# Patient Record
Sex: MALE | Race: WHITE | NOT HISPANIC OR LATINO | Employment: FULL TIME | URBAN - NONMETROPOLITAN AREA
[De-identification: names, ages, dates, MRNs, and addresses within clinical notes are randomized per-mention and may not be internally consistent; named-entity substitution may affect disease eponyms.]

---

## 2023-08-10 PROBLEM — N52.9 MALE ERECTILE DISORDER: Status: ACTIVE | Noted: 2023-08-10

## 2023-08-10 PROBLEM — H91.90 HEARING LOSS: Status: ACTIVE | Noted: 2023-08-10

## 2023-08-10 PROBLEM — I73.00 RAYNAUD'S DISEASE: Status: ACTIVE | Noted: 2023-08-10

## 2023-08-10 RX ORDER — KETOCONAZOLE 20 MG/G
CREAM TOPICAL
COMMUNITY
Start: 2017-01-10 | End: 2023-08-14 | Stop reason: ALTCHOICE

## 2023-08-10 RX ORDER — SILDENAFIL CITRATE 20 MG/1
TABLET ORAL
COMMUNITY
Start: 2021-08-03 | End: 2023-08-14 | Stop reason: ALTCHOICE

## 2023-08-14 ENCOUNTER — OFFICE VISIT (OUTPATIENT)
Dept: PRIMARY CARE | Facility: CLINIC | Age: 58
End: 2023-08-14
Payer: COMMERCIAL

## 2023-08-14 ENCOUNTER — LAB (OUTPATIENT)
Dept: LAB | Facility: LAB | Age: 58
End: 2023-08-14
Payer: COMMERCIAL

## 2023-08-14 VITALS
BODY MASS INDEX: 27.02 KG/M2 | SYSTOLIC BLOOD PRESSURE: 101 MMHG | HEIGHT: 68 IN | DIASTOLIC BLOOD PRESSURE: 62 MMHG | OXYGEN SATURATION: 98 % | HEART RATE: 50 BPM | TEMPERATURE: 98 F | WEIGHT: 178.3 LBS

## 2023-08-14 DIAGNOSIS — Z00.00 WELLNESS EXAMINATION: ICD-10-CM

## 2023-08-14 DIAGNOSIS — Z12.5 PROSTATE CANCER SCREENING: ICD-10-CM

## 2023-08-14 DIAGNOSIS — Z23 NEED FOR SHINGLES VACCINE: ICD-10-CM

## 2023-08-14 DIAGNOSIS — N52.9 MALE ERECTILE DISORDER: Primary | ICD-10-CM

## 2023-08-14 LAB
ALANINE AMINOTRANSFERASE (SGPT) (U/L) IN SER/PLAS: 16 U/L (ref 10–52)
ALBUMIN (G/DL) IN SER/PLAS: 4.7 G/DL (ref 3.4–5)
ALKALINE PHOSPHATASE (U/L) IN SER/PLAS: 63 U/L (ref 33–120)
ANION GAP IN SER/PLAS: 12 MMOL/L (ref 10–20)
ASPARTATE AMINOTRANSFERASE (SGOT) (U/L) IN SER/PLAS: 21 U/L (ref 9–39)
BILIRUBIN TOTAL (MG/DL) IN SER/PLAS: 0.9 MG/DL (ref 0–1.2)
CALCIUM (MG/DL) IN SER/PLAS: 9.2 MG/DL (ref 8.6–10.6)
CARBON DIOXIDE, TOTAL (MMOL/L) IN SER/PLAS: 27 MMOL/L (ref 21–32)
CHLORIDE (MMOL/L) IN SER/PLAS: 107 MMOL/L (ref 98–107)
CHOLESTEROL (MG/DL) IN SER/PLAS: 189 MG/DL (ref 0–199)
CHOLESTEROL IN HDL (MG/DL) IN SER/PLAS: 57.6 MG/DL
CHOLESTEROL/HDL RATIO: 3.3
CREATININE (MG/DL) IN SER/PLAS: 1.11 MG/DL (ref 0.5–1.3)
ERYTHROCYTE DISTRIBUTION WIDTH (RATIO) BY AUTOMATED COUNT: 12.1 % (ref 11.5–14.5)
ERYTHROCYTE MEAN CORPUSCULAR HEMOGLOBIN CONCENTRATION (G/DL) BY AUTOMATED: 32.4 G/DL (ref 32–36)
ERYTHROCYTE MEAN CORPUSCULAR VOLUME (FL) BY AUTOMATED COUNT: 88 FL (ref 80–100)
ERYTHROCYTES (10*6/UL) IN BLOOD BY AUTOMATED COUNT: 5.16 X10E12/L (ref 4.5–5.9)
GFR MALE: 77 ML/MIN/1.73M2
GLUCOSE (MG/DL) IN SER/PLAS: 91 MG/DL (ref 74–99)
HEMATOCRIT (%) IN BLOOD BY AUTOMATED COUNT: 45.4 % (ref 41–52)
HEMOGLOBIN (G/DL) IN BLOOD: 14.7 G/DL (ref 13.5–17.5)
LDL: 109 MG/DL (ref 0–99)
LEUKOCYTES (10*3/UL) IN BLOOD BY AUTOMATED COUNT: 5.1 X10E9/L (ref 4.4–11.3)
NRBC (PER 100 WBCS) BY AUTOMATED COUNT: 0 /100 WBC (ref 0–0)
PLATELETS (10*3/UL) IN BLOOD AUTOMATED COUNT: 178 X10E9/L (ref 150–450)
POTASSIUM (MMOL/L) IN SER/PLAS: 4 MMOL/L (ref 3.5–5.3)
PROTEIN TOTAL: 7.1 G/DL (ref 6.4–8.2)
SODIUM (MMOL/L) IN SER/PLAS: 142 MMOL/L (ref 136–145)
TRIGLYCERIDE (MG/DL) IN SER/PLAS: 112 MG/DL (ref 0–149)
UREA NITROGEN (MG/DL) IN SER/PLAS: 14 MG/DL (ref 6–23)
VLDL: 22 MG/DL (ref 0–40)

## 2023-08-14 PROCEDURE — 1036F TOBACCO NON-USER: CPT | Performed by: FAMILY MEDICINE

## 2023-08-14 PROCEDURE — 80053 COMPREHEN METABOLIC PANEL: CPT

## 2023-08-14 PROCEDURE — 90750 HZV VACC RECOMBINANT IM: CPT | Performed by: FAMILY MEDICINE

## 2023-08-14 PROCEDURE — 99396 PREV VISIT EST AGE 40-64: CPT | Performed by: FAMILY MEDICINE

## 2023-08-14 PROCEDURE — 80061 LIPID PANEL: CPT

## 2023-08-14 PROCEDURE — 90471 IMMUNIZATION ADMIN: CPT | Performed by: FAMILY MEDICINE

## 2023-08-14 PROCEDURE — 85027 COMPLETE CBC AUTOMATED: CPT

## 2023-08-14 PROCEDURE — 36415 COLL VENOUS BLD VENIPUNCTURE: CPT

## 2023-08-14 PROCEDURE — 84153 ASSAY OF PSA TOTAL: CPT

## 2023-08-14 RX ORDER — SILDENAFIL 25 MG/1
25 TABLET, FILM COATED ORAL DAILY PRN
Qty: 10 TABLET | Refills: 11 | Status: SHIPPED | OUTPATIENT
Start: 2023-08-14 | End: 2023-12-12

## 2023-08-14 NOTE — PROGRESS NOTES
"Subjective   Idris Kuhn is a 58 y.o. male who presents for Annual Exam (CPE).  HPI  No complaints  Review of Systems   All other systems reviewed and are negative.      Objective   /62 (BP Location: Right arm, Patient Position: Sitting, BP Cuff Size: Adult)   Pulse 50   Temp 36.7 °C (98 °F) (Temporal)   Ht 1.715 m (5' 7.5\")   Wt 80.9 kg (178 lb 4.8 oz)   SpO2 98%   BMI 27.51 kg/m²    Physical Exam  Vitals reviewed.   Constitutional:       Appearance: Normal appearance. He is normal weight.   HENT:      Head: Normocephalic and atraumatic.      Right Ear: Tympanic membrane, ear canal and external ear normal.      Left Ear: Tympanic membrane, ear canal and external ear normal.      Nose: Nose normal.      Mouth/Throat:      Mouth: Mucous membranes are moist.      Pharynx: Oropharynx is clear.   Eyes:      Extraocular Movements: Extraocular movements intact.      Conjunctiva/sclera: Conjunctivae normal.      Pupils: Pupils are equal, round, and reactive to light.   Neck:      Vascular: No carotid bruit.   Cardiovascular:      Rate and Rhythm: Normal rate and regular rhythm.      Pulses: Normal pulses.      Heart sounds: Normal heart sounds. No murmur heard.  Pulmonary:      Effort: Pulmonary effort is normal. No respiratory distress.      Breath sounds: Normal breath sounds. No wheezing, rhonchi or rales.   Abdominal:      General: Abdomen is flat. Bowel sounds are normal.      Palpations: Abdomen is soft. There is no mass.      Tenderness: There is no abdominal tenderness. There is no guarding.   Genitourinary:     Prostate: Normal.      Rectum: Normal.   Musculoskeletal:         General: No swelling or deformity. Normal range of motion.      Cervical back: Normal range of motion and neck supple.      Right lower leg: No edema.      Left lower leg: No edema.   Lymphadenopathy:      Cervical: No cervical adenopathy.   Skin:     General: Skin is warm and dry.      Capillary Refill: Capillary refill takes " less than 2 seconds.   Neurological:      General: No focal deficit present.      Mental Status: He is alert and oriented to person, place, and time.   Psychiatric:         Mood and Affect: Mood normal.         Behavior: Behavior normal.         Thought Content: Thought content normal.         Judgment: Judgment normal.         Assessment/Plan   Problem List Items Addressed This Visit    None       #1 well visit    Today in the office you had your annual wellness exam    Please have fasting labs upon discharge we will check kidney function liver function blood sugar cholesterol we will call you with those results    We will check a PSA for prostate cancer screening your prostate exam today was normal    Keep eating a heart healthy diet diet rich with fresh fruit and fresh vegetable 5-7 servings a day if possible along with lean proteins.  Try to avoid simple sugars and fast foods    Continue with your excellent exercise routine walking or exercising 30 minutes 5 days a week is ideal    You are up-to-date with your colonoscopy 2016 good for 10 years    You received your second of your 2 shingles vaccine today good for life time    If you otherwise stay healthy all labs are normal I will see you back in 1 year for annual wellness visit

## 2023-08-15 LAB — PROSTATE SPECIFIC ANTIGEN,SCREEN: 1.24 NG/ML (ref 0–4)

## 2023-08-15 NOTE — RESULT ENCOUNTER NOTE
Call the patient give him all of the cholesterol and triglyceride values.  Tell him the LDL cholesterol is very close to being at goal at 109 with the goal being Less than 100.  Tell him the PSA value is 1.24 this is in the normal range we will continue to check on an annual basis.  Tell him the blood sugar is normal the electrolytes are normal the kidney function tests are normal and the liver enzymes are normal.  Tell him the blood counts are normal the hemoglobin is normal indicating no anemia.

## 2024-02-07 ENCOUNTER — APPOINTMENT (OUTPATIENT)
Dept: PRIMARY CARE | Facility: CLINIC | Age: 59
End: 2024-02-07
Payer: COMMERCIAL

## 2024-02-07 NOTE — PROGRESS NOTES
Subjective        Idris Kuhn is a 59 y.o. male who presents for      HPI:      Dr Upton pt     No chief complaint on file.      Idris Kuhn. is a  59 y.o..  male. Who is here for acute visit today.        What concern/ problem/pain/symptom  brings you here today?      how long has pt had sxs?      describe symptoms-    fever-  nausea-  vomiting-  diarrhea-  abdominal pain-  blood in stool-    recent travel?  camping or hiking?  does pt have well water?  has pt taken antibiotics within past 2 weeks?      how often do symptoms occur?    if diarrhea - how many episodes per day-?      has pt tried anything for current symptoms, including medications (OTC or prescription)  ?       what makes symptoms worse?      has pt been seen recently for this problem ( within past 2-3 weeks) ?    if yes- where?    by who?    what treatment was provided?                  Social History     Tobacco Use   Smoking Status Never    Passive exposure: Never   Smokeless Tobacco Never         Review of Systems:        Objective        There were no vitals filed for this visit.          Pt is A and O x3, NAD, nontoxic, well-hydrated   Head- normocephalic and atraumatic,   EYES- conjunctiva- normal   lids- normal  EARS/NOSE- normal external exam   CV- RRR without murmur  PULM- CTA bilaterally, normal respiratory effort  RESPIRATORY EFFORT- normal , no retractions or nasal flaring   ABD- normoactive BS's , soft, no HSM, no CVAT, NT   EXT- no edema,NT  SKIN- no abnormal skin lesions noted  NEURO- no focal deficits  PSYCH- pleasant, normal judgement and insight              BP Readings from Last 3 Encounters:   08/14/23 101/62   08/15/22 119/69   08/03/21 110/69           Wt Readings from Last 3 Encounters:   08/14/23 80.9 kg (178 lb 4.8 oz)   08/15/22 79.9 kg (176 lb 4 oz)   08/03/21 81.2 kg (179 lb)         BMI Readings from Last 3 Encounters:   08/14/23 27.51 kg/m²   08/15/22 28.02 kg/m²   08/03/21 27.42 kg/m²         Assessment/Plan       1. Generalized abdominal pain            No orders of the defined types were placed in this encounter.                Call if no better or if symptoms worsen

## 2024-02-08 ENCOUNTER — OFFICE VISIT (OUTPATIENT)
Dept: URGENT CARE | Facility: CLINIC | Age: 59
End: 2024-02-08
Payer: COMMERCIAL

## 2024-02-08 VITALS
DIASTOLIC BLOOD PRESSURE: 80 MMHG | RESPIRATION RATE: 20 BRPM | HEART RATE: 78 BPM | SYSTOLIC BLOOD PRESSURE: 120 MMHG | TEMPERATURE: 97.4 F | OXYGEN SATURATION: 97 %

## 2024-02-08 DIAGNOSIS — K52.9 GASTROENTERITIS: Primary | ICD-10-CM

## 2024-02-08 PROCEDURE — 1036F TOBACCO NON-USER: CPT | Performed by: PHYSICIAN ASSISTANT

## 2024-02-08 PROCEDURE — 99203 OFFICE O/P NEW LOW 30 MIN: CPT | Performed by: PHYSICIAN ASSISTANT

## 2024-02-08 RX ORDER — ONDANSETRON 4 MG/1
4 TABLET, ORALLY DISINTEGRATING ORAL EVERY 8 HOURS PRN
Qty: 4 TABLET | Refills: 0 | Status: SHIPPED | OUTPATIENT
Start: 2024-02-08

## 2024-02-08 ASSESSMENT — ENCOUNTER SYMPTOMS
DIARRHEA: 1
NAUSEA: 1
VOMITING: 1

## 2024-02-08 ASSESSMENT — PAIN SCALES - GENERAL: PAINLEVEL: 7

## 2024-02-08 NOTE — PROGRESS NOTES
Subjective   Patient ID: Idris Kuhn is a 59 y.o. male.    Patient is a 59-year-old male who complains of nausea, vomiting and diarrhea that he has been experiencing for the past 2 days.  Patient states that although he is experiencing nausea and vomiting, he is able to tolerate fluids.  Patient reports lower abdominal cramping but denies specific or localized right upper quadrant or right lower quadrant abdominal pain.  Patient has no history of GERD, hiatal hernia, irritable bowel syndrome, diverticulitis or other GI conditions.  Patient reports that he has noted no blood or black, tarry stools.  Patient reports no fever, chills or myalgia.  Patient states that other family members at home are asymptomatic.      Vomiting  Associated symptoms: diarrhea    The following portions of the chart were reviewed this encounter and updated as appropriate:       Review of Systems   Gastrointestinal:  Positive for diarrhea, nausea and vomiting.   All other systems reviewed and are negative.  Objective   Physical Exam  Vitals and nursing note reviewed.   Constitutional:       Appearance: Normal appearance. He is normal weight.   HENT:      Head: Normocephalic and atraumatic.      Nose: Nose normal.      Mouth/Throat:      Mouth: Mucous membranes are moist.      Pharynx: Oropharynx is clear.   Eyes:      Extraocular Movements: Extraocular movements intact.      Conjunctiva/sclera: Conjunctivae normal.      Pupils: Pupils are equal, round, and reactive to light.   Cardiovascular:      Rate and Rhythm: Normal rate.      Pulses: Normal pulses.   Pulmonary:      Effort: Pulmonary effort is normal. No respiratory distress.      Breath sounds: Normal breath sounds. No stridor. No wheezing, rhonchi or rales.   Abdominal:      General: Abdomen is flat. Bowel sounds are normal.      Palpations: Abdomen is soft.   Musculoskeletal:      Cervical back: Normal range of motion and neck supple.   Skin:     General: Skin is warm and dry.       Capillary Refill: Capillary refill takes less than 2 seconds.   Neurological:      General: No focal deficit present.      Mental Status: He is alert and oriented to person, place, and time.   Psychiatric:         Mood and Affect: Mood normal.         Behavior: Behavior normal.         Thought Content: Thought content normal.         Judgment: Judgment normal.     Assessment/Plan   Physical exam findings as noted above.  Patient was immediately advised that he will require evaluation in an emergency department where laboratory testing, IV fluid hydration as well as possible CT scan imaging can be obtained.  Patient states that he does not believe that he requires emergency department evaluation at this time.  Patient was given a prescription for Zofran 4 mg ODT #4 and again it was emphasized that he should report to an emergency department if he is unable to tolerate fluids or he develops any degree of worsening of his symptoms.  Patient verbalizes clear understanding of the above recommendation.    CLINICAL IMPRESSION:  Acute Gastroenteritis    Diagnoses and all orders for this visit:  Gastroenteritis  -     ondansetron ODT (Zofran-ODT) 4 mg disintegrating tablet; Take 1 tablet (4 mg) by mouth every 8 hours if needed for nausea or vomiting for up to 4 doses.

## 2024-08-16 ENCOUNTER — APPOINTMENT (OUTPATIENT)
Dept: PRIMARY CARE | Facility: CLINIC | Age: 59
End: 2024-08-16
Payer: COMMERCIAL

## 2024-08-16 ENCOUNTER — LAB (OUTPATIENT)
Dept: LAB | Facility: LAB | Age: 59
End: 2024-08-16
Payer: COMMERCIAL

## 2024-08-16 VITALS
HEART RATE: 62 BPM | BODY MASS INDEX: 27.53 KG/M2 | WEIGHT: 175.4 LBS | DIASTOLIC BLOOD PRESSURE: 68 MMHG | HEIGHT: 67 IN | OXYGEN SATURATION: 97 % | SYSTOLIC BLOOD PRESSURE: 104 MMHG | TEMPERATURE: 97 F

## 2024-08-16 DIAGNOSIS — Z12.5 PROSTATE CANCER SCREENING: ICD-10-CM

## 2024-08-16 DIAGNOSIS — Z00.00 WELLNESS EXAMINATION: ICD-10-CM

## 2024-08-16 DIAGNOSIS — E78.5 HYPERLIPIDEMIA, UNSPECIFIED HYPERLIPIDEMIA TYPE: ICD-10-CM

## 2024-08-16 DIAGNOSIS — R73.9 ELEVATED BLOOD SUGAR LEVEL: ICD-10-CM

## 2024-08-16 DIAGNOSIS — Z00.00 WELLNESS EXAMINATION: Primary | ICD-10-CM

## 2024-08-16 PROCEDURE — 83036 HEMOGLOBIN GLYCOSYLATED A1C: CPT

## 2024-08-16 PROCEDURE — 82306 VITAMIN D 25 HYDROXY: CPT

## 2024-08-16 PROCEDURE — 3008F BODY MASS INDEX DOCD: CPT | Performed by: FAMILY MEDICINE

## 2024-08-16 PROCEDURE — 84443 ASSAY THYROID STIM HORMONE: CPT

## 2024-08-16 PROCEDURE — 85025 COMPLETE CBC W/AUTO DIFF WBC: CPT

## 2024-08-16 PROCEDURE — 84153 ASSAY OF PSA TOTAL: CPT

## 2024-08-16 PROCEDURE — 80061 LIPID PANEL: CPT

## 2024-08-16 PROCEDURE — 36415 COLL VENOUS BLD VENIPUNCTURE: CPT

## 2024-08-16 PROCEDURE — 99396 PREV VISIT EST AGE 40-64: CPT | Performed by: FAMILY MEDICINE

## 2024-08-16 PROCEDURE — 80053 COMPREHEN METABOLIC PANEL: CPT

## 2024-08-16 ASSESSMENT — ENCOUNTER SYMPTOMS
SHORTNESS OF BREATH: 0
NEUROLOGICAL NEGATIVE: 1
MUSCULOSKELETAL NEGATIVE: 1
ENDOCRINE NEGATIVE: 1
PSYCHIATRIC NEGATIVE: 1
HEMATOLOGIC/LYMPHATIC NEGATIVE: 1
DIARRHEA: 0
ALLERGIC/IMMUNOLOGIC NEGATIVE: 1
RESPIRATORY NEGATIVE: 1
EYES NEGATIVE: 1
CONSTITUTIONAL NEGATIVE: 1
CONSTIPATION: 0
NAUSEA: 0
GASTROINTESTINAL NEGATIVE: 1
CHEST TIGHTNESS: 0
VOMITING: 0
CARDIOVASCULAR NEGATIVE: 1

## 2024-08-16 NOTE — PATIENT INSTRUCTIONS
1.  Well visit    Today in the office you had your annual wellness exam    You are up-to-date with a colonoscopy for colon cancer screening    You are up-to-date with your tetanus shot.    I do encourage all my patients to receive a flu shot in the fall    Keep eating a heart healthy diet rich with fresh fruit fresh vegetables lean protein avoid simple sugars avoid fast foods    Keep walking exercising 30 minutes a day is ideal    Please have fasting labs today we will contact you with those results    For your right hip pain I am concerned that you may be pinching on nerves coming out of your lower back.  Try not to sit for more than an hour at a time without getting up and stretching.  Please try to perform the stretching exercises I showed you in the office.  If the symptoms worsen please call    If all labs returned normal you remain healthy I will see you back in 1 year for an annual wellness visit

## 2024-08-16 NOTE — PROGRESS NOTES
"Subjective   Patient ID: Idris Kuhn is a 59 y.o. male who presents for Annual Exam (CPE).    HPI     The patient mentions having restless legs with some paraesthesias at night time if he is laying on one side. It causes some discomfort.     The patient denies any changes in vision, hearing or dental.     The patient maintains they do not have any chest pain, chest tightness or shortness of breath.    They do not experience nausea, emesis, changes in bowel movements or dyspepsia.    The patient denies nocturia. They report urinating 1 times a night. The nocturia does not cause sleep disturbances.     The patient denies any issues with erections.    The patient's colonoscopy is up to date.    The patient's vaccinations are up to date.    Review of Systems   Constitutional: Negative.    HENT: Negative.  Negative for dental problem and hearing loss.    Eyes: Negative.  Negative for visual disturbance.   Respiratory: Negative.  Negative for chest tightness and shortness of breath.    Cardiovascular: Negative.  Negative for chest pain.   Gastrointestinal: Negative.  Negative for constipation, diarrhea, nausea and vomiting.   Endocrine: Negative.    Genitourinary: Negative.    Musculoskeletal: Negative.    Skin: Negative.    Allergic/Immunologic: Negative.    Neurological: Negative.    Hematological: Negative.    Psychiatric/Behavioral: Negative.         Objective   /68 (BP Location: Left arm, Patient Position: Sitting, BP Cuff Size: Adult)   Pulse 62   Temp 36.1 °C (97 °F) (Temporal)   Ht 1.702 m (5' 7\")   Wt 79.6 kg (175 lb 6.4 oz)   SpO2 97%   BMI 27.47 kg/m²     Physical Exam  Constitutional:       Appearance: Normal appearance.   HENT:      Head: Normocephalic and atraumatic.      Nose: Nose normal.   Eyes:      Extraocular Movements: Extraocular movements intact.      Conjunctiva/sclera: Conjunctivae normal.      Pupils: Pupils are equal, round, and reactive to light.   Cardiovascular:      Rate and " Rhythm: Normal rate and regular rhythm.      Pulses: Normal pulses.      Heart sounds: Normal heart sounds.   Pulmonary:      Effort: Pulmonary effort is normal.      Breath sounds: Normal breath sounds and air entry.   Abdominal:      General: Bowel sounds are normal.      Palpations: Abdomen is soft.   Musculoskeletal:         General: Normal range of motion.      Cervical back: Normal range of motion.   Neurological:      Mental Status: He is alert.   Psychiatric:         Mood and Affect: Mood normal.         Behavior: Behavior normal.         Thought Content: Thought content normal.         Judgment: Judgment normal.         Assessment/Plan          1. Wellness examination  Follow Up In [facility] - PCP - Health Maintenance    Vitamin D 25-Hydroxy,Total (for eval of Vitamin D levels)    Hemoglobin A1C    TSH with reflex to Free T4 if abnormal    Lipid Panel    Comprehensive Metabolic Panel    CBC and Auto Differential    Prostate Specific Antigen, Screen    Follow Up In [facility] - PCP - Health Maintenance      2. Prostate cancer screening  Prostate Specific Antigen, Screen        .  Well visit    Today in the office you had your annual wellness exam    You are up-to-date with a colonoscopy for colon cancer screening    You are up-to-date with your tetanus shot.    I do encourage all my patients to receive a flu shot in the fall    Keep eating a heart healthy diet rich with fresh fruit fresh vegetables lean protein avoid simple sugars avoid fast foods    Keep walking exercising 30 minutes a day is ideal    Please have fasting labs today we will contact you with those results    For your right hip pain I am concerned that you may be pinching on nerves coming out of your lower back.  Try not to sit for more than an hour at a time without getting up and stretching.  Please try to perform the stretching exercises I showed you in the office.  If the symptoms worsen please call    If all labs  returned normal you remain healthy I will see you back in 1 year for an annual wellness visit    Follow-up in 6 months or sooner if there are any concerns.    Scribe Attestation  By signing my name below, I, Bri Henriquez, Luis   attest that this documentation has been prepared under the direction and in the presence of Anjum Upton MD.    This note has been transcribed using a medical scribe and there is a possibility of unintentional typing misprints.

## 2024-08-17 LAB
25(OH)D3 SERPL-MCNC: 34 NG/ML (ref 30–100)
ALBUMIN SERPL BCP-MCNC: 4.8 G/DL (ref 3.4–5)
ALP SERPL-CCNC: 61 U/L (ref 33–120)
ALT SERPL W P-5'-P-CCNC: 16 U/L (ref 10–52)
ANION GAP SERPL CALC-SCNC: 13 MMOL/L (ref 10–20)
AST SERPL W P-5'-P-CCNC: 20 U/L (ref 9–39)
BASOPHILS # BLD AUTO: 0.01 X10*3/UL (ref 0–0.1)
BASOPHILS NFR BLD AUTO: 0.2 %
BILIRUB SERPL-MCNC: 1.4 MG/DL (ref 0–1.2)
BUN SERPL-MCNC: 17 MG/DL (ref 6–23)
CALCIUM SERPL-MCNC: 9.6 MG/DL (ref 8.6–10.6)
CHLORIDE SERPL-SCNC: 109 MMOL/L (ref 98–107)
CHOLEST SERPL-MCNC: 196 MG/DL (ref 0–199)
CHOLESTEROL/HDL RATIO: 3.5
CO2 SERPL-SCNC: 25 MMOL/L (ref 21–32)
CREAT SERPL-MCNC: 1.17 MG/DL (ref 0.5–1.3)
EGFRCR SERPLBLD CKD-EPI 2021: 72 ML/MIN/1.73M*2
EOSINOPHIL # BLD AUTO: 0.04 X10*3/UL (ref 0–0.7)
EOSINOPHIL NFR BLD AUTO: 0.7 %
ERYTHROCYTE [DISTWIDTH] IN BLOOD BY AUTOMATED COUNT: 12.3 % (ref 11.5–14.5)
EST. AVERAGE GLUCOSE BLD GHB EST-MCNC: 94 MG/DL
GLUCOSE SERPL-MCNC: 88 MG/DL (ref 74–99)
HBA1C MFR BLD: 4.9 %
HCT VFR BLD AUTO: 47.3 % (ref 41–52)
HDLC SERPL-MCNC: 55.4 MG/DL
HGB BLD-MCNC: 15.8 G/DL (ref 13.5–17.5)
IMM GRANULOCYTES # BLD AUTO: 0.01 X10*3/UL (ref 0–0.7)
IMM GRANULOCYTES NFR BLD AUTO: 0.2 % (ref 0–0.9)
LDLC SERPL CALC-MCNC: 127 MG/DL
LYMPHOCYTES # BLD AUTO: 1.28 X10*3/UL (ref 1.2–4.8)
LYMPHOCYTES NFR BLD AUTO: 20.9 %
MCH RBC QN AUTO: 28.4 PG (ref 26–34)
MCHC RBC AUTO-ENTMCNC: 33.4 G/DL (ref 32–36)
MCV RBC AUTO: 85 FL (ref 80–100)
MONOCYTES # BLD AUTO: 0.57 X10*3/UL (ref 0.1–1)
MONOCYTES NFR BLD AUTO: 9.3 %
NEUTROPHILS # BLD AUTO: 4.21 X10*3/UL (ref 1.2–7.7)
NEUTROPHILS NFR BLD AUTO: 68.7 %
NON HDL CHOLESTEROL: 141 MG/DL (ref 0–149)
NRBC BLD-RTO: 0 /100 WBCS (ref 0–0)
PLATELET # BLD AUTO: 202 X10*3/UL (ref 150–450)
POTASSIUM SERPL-SCNC: 4 MMOL/L (ref 3.5–5.3)
PROT SERPL-MCNC: 7.3 G/DL (ref 6.4–8.2)
PSA SERPL-MCNC: 0.85 NG/ML
RBC # BLD AUTO: 5.56 X10*6/UL (ref 4.5–5.9)
SODIUM SERPL-SCNC: 143 MMOL/L (ref 136–145)
TRIGL SERPL-MCNC: 66 MG/DL (ref 0–149)
TSH SERPL-ACNC: 1.17 MIU/L (ref 0.44–3.98)
VLDL: 13 MG/DL (ref 0–40)
WBC # BLD AUTO: 6.1 X10*3/UL (ref 4.4–11.3)

## 2024-08-19 PROBLEM — Z00.00 WELLNESS EXAMINATION: Status: ACTIVE | Noted: 2024-08-19

## 2024-08-19 PROBLEM — Z12.5 PROSTATE CANCER SCREENING: Status: ACTIVE | Noted: 2024-08-19

## 2025-07-28 ASSESSMENT — PROMIS GLOBAL HEALTH SCALE
RATE_QUALITY_OF_LIFE: EXCELLENT
CARRYOUT_SOCIAL_ACTIVITIES: EXCELLENT
RATE_GENERAL_HEALTH: EXCELLENT
CARRYOUT_PHYSICAL_ACTIVITIES: COMPLETELY
RATE_PHYSICAL_HEALTH: EXCELLENT
EMOTIONAL_PROBLEMS: RARELY
RATE_AVERAGE_PAIN: 0
RATE_SOCIAL_SATISFACTION: EXCELLENT
RATE_MENTAL_HEALTH: VERY GOOD

## 2025-08-01 ENCOUNTER — APPOINTMENT (OUTPATIENT)
Dept: PRIMARY CARE | Facility: CLINIC | Age: 60
End: 2025-08-01
Payer: COMMERCIAL

## 2025-08-01 VITALS
WEIGHT: 177.8 LBS | TEMPERATURE: 97.5 F | OXYGEN SATURATION: 98 % | SYSTOLIC BLOOD PRESSURE: 120 MMHG | HEART RATE: 53 BPM | DIASTOLIC BLOOD PRESSURE: 69 MMHG | HEIGHT: 67 IN | BODY MASS INDEX: 27.91 KG/M2

## 2025-08-01 DIAGNOSIS — E78.2 MODERATE MIXED HYPERLIPIDEMIA NOT REQUIRING STATIN THERAPY: ICD-10-CM

## 2025-08-01 DIAGNOSIS — Z00.00 WELLNESS EXAMINATION: Primary | ICD-10-CM

## 2025-08-01 DIAGNOSIS — Z12.5 PROSTATE CANCER SCREENING: ICD-10-CM

## 2025-08-01 DIAGNOSIS — I73.00 RAYNAUD'S DISEASE WITHOUT GANGRENE: ICD-10-CM

## 2025-08-01 DIAGNOSIS — Z13.6 SCREENING FOR CARDIOVASCULAR CONDITION: ICD-10-CM

## 2025-08-01 PROCEDURE — 3008F BODY MASS INDEX DOCD: CPT | Performed by: FAMILY MEDICINE

## 2025-08-01 PROCEDURE — 99396 PREV VISIT EST AGE 40-64: CPT | Performed by: FAMILY MEDICINE

## 2025-08-01 ASSESSMENT — PATIENT HEALTH QUESTIONNAIRE - PHQ9
1. LITTLE INTEREST OR PLEASURE IN DOING THINGS: NOT AT ALL
2. FEELING DOWN, DEPRESSED OR HOPELESS: NOT AT ALL
SUM OF ALL RESPONSES TO PHQ9 QUESTIONS 1 AND 2: 0

## 2025-08-01 NOTE — PATIENT INSTRUCTIONS
VISIT SUMMARY:  Today, you had your annual wellness exam.   Overall, your health status remains stable with no significant changes. You reported no chest pain, respiratory issues, or significant changes in eyesight or hearing. Your bowel movements occur every three to four days, and you manage Raynaud's phenomenon by staying warm. Your prostate exam was normal, and there were no new family history concerns. Your blood pressure and heart sounds were normal, and there were no significant changes in your weight or height.    YOUR PLAN:  -ADULT WELLNESS VISIT: Your annual wellness exam showed no significant changes in your health. We will order a CT scan of your coronary arteries to check for any changes since your last calcium score in 2019. We will also order a PSA test and labs to check your kidney function, liver function, blood sugar, and cholesterol. Please follow a heart-healthy diet with 5-7 servings of fresh fruits and vegetables daily, lean protein, and avoid simple sugars and fast foods. Stay well hydrated, especially in summer. We will schedule a follow-up in one year if all labs are normal.    -RAYNAUD'S PHENOMENON: Raynaud's phenomenon is a condition where blood flow to your fingers and toes is reduced, usually triggered by cold temperatures or stress. You manage it well by staying warm. Please continue to protect your feet and hands during winter months. Contact us if you notice any skin breakdown or signs of frostbite.    -CONSTIPATION: Constipation means having infrequent or hard-to-pass bowel movements. You experience bowel movements every three to four days. To help with bowel regularity, try to eat more fresh fruits and vegetables.    INSTRUCTIONS:  We will order a CT scan of your coronary arteries, a PSA test, and labs to check your kidney function, liver function, blood sugar, and cholesterol. Please follow a heart-healthy diet and stay well hydrated. Schedule a follow-up appointment in one year if  all labs are normal.

## 2025-08-01 NOTE — PROGRESS NOTES
"Subjective   Patient ID: Idris Kuhn is a 60 y.o. male who presents for Annual Exam (CPE).  History of Present Illness  Idris Kuhn is a 60 year old male who presents for an annual wellness exam.    He experiences Raynaud's phenomenon, primarily triggered by transitioning from hot to cold environments, and manages it by staying warm during winter.    He experiences infrequent bowel movements, occurring every three to four days. He stopped using Metamucil as it caused constipation and has not tried Miralax.    Review of Systems   All other systems reviewed and are negative.      Objective     /69 (BP Location: Right arm, Patient Position: Sitting, BP Cuff Size: Large adult)   Pulse 53   Temp 36.4 °C (97.5 °F) (Temporal)   Ht 1.702 m (5' 7\")   Wt 80.6 kg (177 lb 12.8 oz)   SpO2 98%   BMI 27.85 kg/m²      Physical Exam  GENERAL: Alert, cooperative, well developed, no acute distress.  HEENT: Normocephalic, normal oropharynx, moist mucous membranes, ears clear.  CHEST: Clear to auscultation bilaterally, no wheezes, rhonchi, or crackles.  CARDIOVASCULAR: Normal heart rate and rhythm, S1 and S2 normal without murmurs, peripheral pulses intact.  ABDOMEN: Soft, non-tender, non-distended, without organomegaly, normal bowel sounds.  RECTAL: Prostate normal size and consistency.  EXTREMITIES: No cyanosis or edema.  NEUROLOGICAL: Cranial nerves grossly intact, moves all extremities without gross motor or sensory deficit.    Assessment & Plan  Adult Wellness Visit  Annual wellness examination reveals no significant changes in health status. He reports no chest pain, pressure, or tightness during exercise. Respiratory status is good, with no inhaler use, and he remains a nonsmoker. Bowel movements occur every three to four days with occasional issues. No significant changes in eyesight or hearing. Dermatological assessment shows no concerning rashes, moles, or freckles. Musculoskeletal evaluation indicates no " significant arthritis pain. Psychological assessment reveals no anxiety or depression beyond usual levels. Prostate exam is normal, with no new family history of prostate disease. Erectile function is present but less frequent, not a high priority. No significant changes in weight or height. Blood pressure and heart sounds are normal.  - Order CT scan of coronary arteries to ensure no changes since last calcium score in 2019.  - Order PSA test with next set of labs.  - Order labs to check kidney function, liver function, blood sugar, and cholesterol.  - Advise heart-healthy diet with 5-7 servings of fresh fruits and vegetables daily, lean protein, and avoidance of simple sugars and fast foods.  - Advise staying well hydrated, especially in summer.  - Schedule follow-up in one year if all labs are normal.    Raynaud's phenomenon  Raynaud's phenomenon is present but not significantly bothersome. Symptoms are more noticeable when transitioning from hot to cold environments but do not cause significant issues.  - Advise staying warm in winter months, protecting feet and hands.  - Instruct to contact provider if there is any breakdown of the skin or signs of frostbite for potential medication intervention.    Constipation  Bowel movements occur every three to four days with occasional issues. Previous use of Metamucil caused hard stools, and Miralax was not mentioned as a current treatment.  - Advise trying to eat more fresh fruits and vegetables to help with bowel regularity.    1. Wellness examination  Follow Up In [facility] - PCP - Health Maintenance    Prostate Specific Antigen, Screen    Comprehensive Metabolic Panel    Lipid Panel    CBC and Auto Differential    Hemoglobin A1C    Prostate Specific Antigen, Screen    Comprehensive Metabolic Panel    Lipid Panel    CBC and Auto Differential    Hemoglobin A1C      2. Moderate mixed hyperlipidemia not requiring statin therapy  CT cardiac scoring wo IV  contrast      3. Prostate cancer screening  Prostate Specific Antigen, Screen    Prostate Specific Antigen, Screen      4. Raynaud's disease without gangrene  Follow Up In [facility] - PCP - Health Maintenance    Prostate Specific Antigen, Screen    Comprehensive Metabolic Panel    Lipid Panel    CBC and Auto Differential    Hemoglobin A1C    Prostate Specific Antigen, Screen    Comprehensive Metabolic Panel    Lipid Panel    CBC and Auto Differential    Hemoglobin A1C      5. Screening for cardiovascular condition  CT cardiac scoring wo IV contrast           Anjum Upton MD     This medical note was created with the assistance of artificial intelligence (AI) for documentation purposes. The content has been reviewed and confirmed by the healthcare provider for accuracy and completeness. Patient consented to the use of audio recording and use of AI during their visit.

## 2025-08-04 PROBLEM — E78.2 MODERATE MIXED HYPERLIPIDEMIA NOT REQUIRING STATIN THERAPY: Status: ACTIVE | Noted: 2025-08-04

## 2025-08-04 PROBLEM — Z13.6 SCREENING FOR CARDIOVASCULAR CONDITION: Status: ACTIVE | Noted: 2024-08-19

## 2025-08-07 LAB
ALBUMIN SERPL-MCNC: 4.3 G/DL (ref 3.6–5.1)
ALP SERPL-CCNC: 59 U/L (ref 35–144)
ALT SERPL-CCNC: 16 U/L (ref 9–46)
ANION GAP SERPL CALCULATED.4IONS-SCNC: 7 MMOL/L (CALC) (ref 7–17)
AST SERPL-CCNC: 19 U/L (ref 10–35)
BASOPHILS # BLD AUTO: 9 CELLS/UL (ref 0–200)
BASOPHILS NFR BLD AUTO: 0.2 %
BILIRUB SERPL-MCNC: 0.8 MG/DL (ref 0.2–1.2)
BUN SERPL-MCNC: 15 MG/DL (ref 7–25)
CALCIUM SERPL-MCNC: 9 MG/DL (ref 8.6–10.3)
CHLORIDE SERPL-SCNC: 108 MMOL/L (ref 98–110)
CHOLEST SERPL-MCNC: 181 MG/DL
CHOLEST/HDLC SERPL: 3.5 (CALC)
CO2 SERPL-SCNC: 27 MMOL/L (ref 20–32)
CREAT SERPL-MCNC: 1.04 MG/DL (ref 0.7–1.3)
EGFRCR SERPLBLD CKD-EPI 2021: 83 ML/MIN/1.73M2
EOSINOPHIL # BLD AUTO: 71 CELLS/UL (ref 15–500)
EOSINOPHIL NFR BLD AUTO: 1.5 %
ERYTHROCYTE [DISTWIDTH] IN BLOOD BY AUTOMATED COUNT: 12.4 % (ref 11–15)
EST. AVERAGE GLUCOSE BLD GHB EST-MCNC: 108 MG/DL
EST. AVERAGE GLUCOSE BLD GHB EST-SCNC: 6 MMOL/L
GLUCOSE SERPL-MCNC: 103 MG/DL (ref 65–99)
HBA1C MFR BLD: 5.4 %
HCT VFR BLD AUTO: 48.3 % (ref 38.5–50)
HDLC SERPL-MCNC: 51 MG/DL
HGB BLD-MCNC: 16.2 G/DL (ref 13.2–17.1)
LDLC SERPL CALC-MCNC: 114 MG/DL (CALC)
LYMPHOCYTES # BLD AUTO: 1142 CELLS/UL (ref 850–3900)
LYMPHOCYTES NFR BLD AUTO: 24.3 %
MCH RBC QN AUTO: 29.6 PG (ref 27–33)
MCHC RBC AUTO-ENTMCNC: 33.5 G/DL (ref 32–36)
MCV RBC AUTO: 88.3 FL (ref 80–100)
MONOCYTES # BLD AUTO: 465 CELLS/UL (ref 200–950)
MONOCYTES NFR BLD AUTO: 9.9 %
NEUTROPHILS # BLD AUTO: 3013 CELLS/UL (ref 1500–7800)
NEUTROPHILS NFR BLD AUTO: 64.1 %
NONHDLC SERPL-MCNC: 130 MG/DL (CALC)
PLATELET # BLD AUTO: 175 THOUSAND/UL (ref 140–400)
PMV BLD REES-ECKER: 9.5 FL (ref 7.5–12.5)
POTASSIUM SERPL-SCNC: 3.8 MMOL/L (ref 3.5–5.3)
PROT SERPL-MCNC: 6.8 G/DL (ref 6.1–8.1)
PSA SERPL-MCNC: 1.05 NG/ML
RBC # BLD AUTO: 5.47 MILLION/UL (ref 4.2–5.8)
SODIUM SERPL-SCNC: 142 MMOL/L (ref 135–146)
TRIGL SERPL-MCNC: 72 MG/DL
WBC # BLD AUTO: 4.7 THOUSAND/UL (ref 3.8–10.8)

## 2025-08-19 ENCOUNTER — APPOINTMENT (OUTPATIENT)
Dept: PRIMARY CARE | Facility: CLINIC | Age: 60
End: 2025-08-19
Payer: COMMERCIAL

## 2025-09-04 ENCOUNTER — APPOINTMENT (OUTPATIENT)
Dept: RADIOLOGY | Facility: HOSPITAL | Age: 60
End: 2025-09-04
Payer: COMMERCIAL

## 2025-09-12 ENCOUNTER — APPOINTMENT (OUTPATIENT)
Dept: RADIOLOGY | Facility: HOSPITAL | Age: 60
End: 2025-09-12
Payer: COMMERCIAL

## 2026-08-04 ENCOUNTER — APPOINTMENT (OUTPATIENT)
Dept: PRIMARY CARE | Facility: CLINIC | Age: 61
End: 2026-08-04
Payer: COMMERCIAL